# Patient Record
Sex: MALE | Race: WHITE | NOT HISPANIC OR LATINO | URBAN - METROPOLITAN AREA
[De-identification: names, ages, dates, MRNs, and addresses within clinical notes are randomized per-mention and may not be internally consistent; named-entity substitution may affect disease eponyms.]

---

## 2021-04-03 ENCOUNTER — EMERGENCY (EMERGENCY)
Facility: HOSPITAL | Age: 36
LOS: 1 days | Discharge: HOME | End: 2021-04-03
Admitting: EMERGENCY MEDICINE
Payer: SELF-PAY

## 2021-04-03 VITALS
TEMPERATURE: 98 F | OXYGEN SATURATION: 98 % | DIASTOLIC BLOOD PRESSURE: 68 MMHG | SYSTOLIC BLOOD PRESSURE: 96 MMHG | RESPIRATION RATE: 16 BRPM | HEART RATE: 76 BPM
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
## 2021-04-03 DIAGNOSIS — R41.82 ALTERED MENTAL STATUS, UNSPECIFIED: ICD-10-CM

## 2021-04-03 DIAGNOSIS — F10.920 ALCOHOL USE, UNSPECIFIED WITH INTOXICATION, UNCOMPLICATED: ICD-10-CM

## 2021-04-03 PROCEDURE — 99053 MED SERV 10PM-8AM 24 HR FAC: CPT

## 2021-04-03 PROCEDURE — 99283 EMERGENCY DEPT VISIT LOW MDM: CPT

## 2021-04-03 NOTE — ED PROVIDER NOTE - PATIENT PORTAL LINK FT
You can access the FollowMyHealth Patient Portal offered by [facility] by registering at the following website: [email or web address]. By joining [facility]’s FollowMyHealth portal, you will also be able to view your health information using other applications (apps) compatible with our system.

## 2021-04-03 NOTE — ED PROVIDER NOTE - NSFOLLOWUPINSTRUCTIONS_ED_ALL_ED_FT
Alcohol Abuse    Alcohol intoxication occurs when the amount of alcohol that a person has consumed impairs his or her ability to mentally and physically function. Chronic alcohol consumption can also lead to a variety of health issues including neurological disease, stomach disease, heart disease, liver disease, etc. Do not drive after drinking alcohol. Drinking enough alcohol to end up in an Emergency Room suggests you may have an alcohol abuse problem. Seek help at a drug addiction center.    SEEK IMMEDIATE MEDICAL CARE IF YOU HAVE ANY OF THE FOLLOWING SYMPTOMS: seizures, vomiting blood, blood in your stool, lightheadedness/dizziness, or becoming shaky to tremulous when you stop drinking.      1)  PLEASE FOLLOW-UP WITH YOUR PRIMARY CARE DOCTOR OR REFERRED SPECIALIST IN 1-2 DAYS.     2)  BRING ALL PAPERWORK FROM TODAY'S EMERGENCY ROOM  VISIT TO YOUR FOLLOW-UP VISIT.  IF YOU DO NOT HAVE A PRIMARY CARE DOCTOR PLEASE REFER TO THE OFFICE/CLINIC INFORMATION GIVEN ABOVE.  YOU MAY ALSO CALL [phone] AND ASK FOR MS. TANIA MYERS.  SHE CAN HELP YOU MAKE A FOLLOW-UP APPOINTMENT.  HER HOURS ARE 11AM-7PM MONDAY - FRIDAY.    3) PLEASE RETURN TO THE ER IMMEDIATELY OR CALL 911 FOR ANY HIGH FEVER, TROUBLE BREATHING, CHEST PAIN, WEAKNESS, VOMITING, SEVERE PAIN, OR ANY OTHER CONCERNS.

## 2021-04-03 NOTE — ED ADULT NURSE NOTE - NSIMPLEMENTINTERV_GEN_ALL_ED
Implemented All Fall with Harm Risk Interventions:  [place] to call system. Call bell, personal items and telephone within reach. Instruct patient to call for assistance. Room bathroom lighting operational. Non-slip footwear when patient is off stretcher. Physically safe environment: no spills, clutter or unnecessary equipment. Stretcher in lowest position, wheels locked, appropriate side rails in place. Provide visual cue, wrist band, yellow gown, etc. Monitor gait and stability. Monitor for mental status changes and reorient to person, place, and time. Review medications for side effects contributing to fall risk. Reinforce activity limits and safety measures with patient and family. Provide visual clues: red socks.

## 2021-04-03 NOTE — ED PROVIDER NOTE - PROGRESS NOTE DETAILS
The patient is now awake and alert, clinically sober.  Able to walk a straight line.  Repeat exam and neuro/cranial nerve exams normal.  No evidence of head/neck trauma.  Patient denies any pain or other complaints.  Denies cp/sob/ha/abd pain.  Abd soft, lungs clear, heart exam normal.  Vickie po challenge.  Patient says only used alcohol no other substances.  Denies any assault.  Feels much better and pt feels safe for discharge.  No evidence of intoxication at this time or alcohol withdrawal.  No other complaints on discharge.

## 2021-04-03 NOTE — ED ADULT NURSE NOTE - DISCHARGE DATE/TIME
Rec'd [facility] disability paperwork. Paper work signed by MD. Faxed to [phone]. Spoke w pt to let him know paperwork was faxed.   
03-Apr-2021 02:14

## 2021-04-03 NOTE — ED ADULT NURSE NOTE - BEFAST SPEECH SLURRED
Pt came to the floor around 1030 am from cardiac cath lab without incident. Right groin site soft without hematoma. Pulses in extremities palpable. HR on the monitor varied from mid- 60's to as low as high 40's at time with pt having moments of nausea and heaving. According to the pt he has meniere's disease and sometimes has vertigo.    RN informed cardiology team who has seen him subsequently twice since his arrival to the floor and PRN nausea medication was ordered. Pt tolerated PO phenergan so far and is able to sip fluids, palate jell-o and juice.     Pt's wife and mother are at the bedside and plan of care was explained to them. Pt really wants to be mobile up out of bed (and probably walk the hallways) but with his meniere's and vertigo and potentially some orthostatic hypotension RN urged him to wait until the cardiac team gives the order that it's fine for the pt to ambulate.    Pt has been voiding adequately and had a large BM without incident. Cardiac Rehab nurse rounded on the pt and informed him and family that rehab would be outpatient.     VSS at this time. Allowed for questions to be answered. And discussed medications with pt and his family. Will continue to monitor the pt.    No

## 2021-04-03 NOTE — ED PROVIDER NOTE - OBJECTIVE STATEMENT
35 year old male brought in by EMS for alcohol intoxication and unsteady gait. no trauma. no fall. walked in with EMS.   placed in stretcher, will observe

## 2021-04-03 NOTE — ED ADULT NURSE NOTE - OBJECTIVE STATEMENT
35y6 male presents to ED c/o AMS. Pt endorses drinking alcohol tonight, whiskey. Awake and alert, slurred speech noted. Denies drug use, other medical complaints. No visible signs of trauma.
